# Patient Record
(demographics unavailable — no encounter records)

---

## 2024-12-30 NOTE — HISTORY OF PRESENT ILLNESS
[Patient reported PAP Smear was normal] : Patient reported PAP Smear was normal [FreeTextEntry1] : 23 year old female LMP 12/5/24 presents for annual GYN exam. Pt reports that she gets her period monthly and it last for 7 days. Reports that she get very heavy periods that she sometimes goes through 5 super tampons on her heaviest day.  States that she has painful cramps on the 2nd and 3rd day of her period, takes Midol which helps. Also reports worsening anxiety and mood changes the week prior to her menses. Works night shift in the NICU, feels that her sleep schedule is also contributing to her PMS.   PMHx: anxiety  SHx: Pt is not currently sexually active. Gender of partners: female.   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   [PapSmeardate] : 8/11/22 [Currently Active] : currently active [Women] : women [No] : No [Patient refuses STI testing] : Patient refuses STI testing

## 2024-12-30 NOTE — REVIEW OF SYSTEMS
[Patient Intake Form Reviewed] : Patient intake form was reviewed [Abn Vaginal bleeding] : abnormal vaginal bleeding [Pelvic pain] : pelvic pain [Anxiety] : anxiety [PMS/PMDD Symptoms] : PMS/PMDD symptoms [Negative] : Heme/Lymph

## 2024-12-30 NOTE — PLAN
[FreeTextEntry1] : Routine GYN Exam -Discussed and reviewed importance of monthly BSE -Declines STI testing, importance safe sexual practices discussed -Pap test collected and sent at todays visit -f/u PCP for recommended HCM, vaccinations and CA screening  PMS and Menorrhagia -discussed treatment options such as TATIANA ocp, Kyleena iud and SSRI. pt will consider-wants to talk with her parents and will call if she wants to further discuss treatment options.   rto 1 yr or sooner as needed. 
no

## 2025-05-20 NOTE — HISTORY OF PRESENT ILLNESS
[FreeTextEntry1] : Pt is a 23 yo G0 w/no significant PMH presenting for surgical consultation in the setting of a right ovarian mass.  Normally periods are regular, with heavy bleeding lasting 1 week and pain is managed with OTC pain medication. However, her last 2 periods were longer lasting 2 weeks with lighter spotting for the first week followed by a more normal period. She denies any associated symptoms or pain. She has no other concerns today. We discussed endometriosis and endometrioma pathogenesis. She inquired about egg freezing and we discussed the risks and benefits in the setting of possible endometriosis.  PMH: IBS, anxiety PSH: None OBHx: G0 GYN: see above, no h/o abnormal paps (last Dec 2024)  Meds: Allerga All: NKDA  FH: No GYN cancers SH: No A/T/D  Imaging:  US: FINDINGS: Uterus: 5.4 cm x 4.4 cm x 4.2 cm. Within normal limits. Endometrium: 3 mm. Within normal limits. Right ovary: 7.7 cm x 4.7 cm x 6.3 cm. Evidence of a 5.2 x 3.2 x 3.2 cm sized complex mass. Recommend further evaluation with contrast-enhanced MR imaging. Left ovary: 3.6 cm x 1.6 cm x 2.0 cm. Within normal limits Fluid: None. IMPRESSION: 5.2 cm sized complex right adnexal mass. MRI: IMPRESSION: 7.0 x 6.0 cm multi cystic lesion involving the right ovary. There is intrinsic T1 hyperintensity within a portion of the lesion, T2 shading, and no enhancement on subtraction images. Overall, I would favor this to represent an endometrioma, however, cystic ovarian malignancy is also on the differential. Recommend follow-up MRI in 3 months to reassess. Thickening of the junctional zone of the uterus, suggestive of adenomyosis.

## 2025-05-20 NOTE — HISTORY OF PRESENT ILLNESS
[FreeTextEntry1] : Pt is a 25 yo G0 w/no significant PMH presenting for surgical consultation in the setting of a right ovarian mass.  Normally periods are regular, with heavy bleeding lasting 1 week and pain is managed with OTC pain medication. However, her last 2 periods were longer lasting 2 weeks with lighter spotting for the first week followed by a more normal period. She denies any associated symptoms or pain. She has no other concerns today. We discussed endometriosis and endometrioma pathogenesis. She inquired about egg freezing and we discussed the risks and benefits in the setting of possible endometriosis.  PMH: IBS, anxiety PSH: None OBHx: G0 GYN: see above, no h/o abnormal paps (last Dec 2024)  Meds: Allerga All: NKDA  FH: No GYN cancers SH: No A/T/D  Imaging:  US: FINDINGS: Uterus: 5.4 cm x 4.4 cm x 4.2 cm. Within normal limits. Endometrium: 3 mm. Within normal limits. Right ovary: 7.7 cm x 4.7 cm x 6.3 cm. Evidence of a 5.2 x 3.2 x 3.2 cm sized complex mass. Recommend further evaluation with contrast-enhanced MR imaging. Left ovary: 3.6 cm x 1.6 cm x 2.0 cm. Within normal limits Fluid: None. IMPRESSION: 5.2 cm sized complex right adnexal mass. MRI: IMPRESSION: 7.0 x 6.0 cm multi cystic lesion involving the right ovary. There is intrinsic T1 hyperintensity within a portion of the lesion, T2 shading, and no enhancement on subtraction images. Overall, I would favor this to represent an endometrioma, however, cystic ovarian malignancy is also on the differential. Recommend follow-up MRI in 3 months to reassess. Thickening of the junctional zone of the uterus, suggestive of adenomyosis.

## 2025-05-20 NOTE — PLAN
[FreeTextEntry1] : Discussed the management of complex ovarian cysts at length.  Risks including but not limited to torsion, rupture and malignancy were discussed.  With findings consistent with probable pathologic ovarian cyst recommend surgical intervention.  Findings c/w probable endometrioma with adenomyosis in the uterus as well.  tumor markers sent pt to have fu pelvic sono and to schedule laparoscopic rt cystectomy During this visit 15 minutes were spent face-to-face with greater than 50% of the time dedicated to counseling. During this visit 40 minutes were spent face-to-face with greater than 50% of the time dedicated to counseling.

## 2025-06-11 NOTE — END OF VISIT
[FreeTextEntry3] : I, Pearl Watson, acted as a scribe on behalf of Dr. Sasha Wallace M.D. on 06/11/2025.   All medical entries made by the scribe were at my Dr. Sasha Wallace M.D. direction and personally dictated by me on 06/11/2025. I have reviewed the chart and agree that the record accurately reflects my personal performance of the history, physical exam, assessment and plan. I have also personally directed, reviewed, and agreed with the chart.

## 2025-06-11 NOTE — HISTORY OF PRESENT ILLNESS
[Pain is well-controlled] : pain is well-controlled [Clean/Dry/Intact] : clean, dry and intact [Erythema] : not erythematous [Swelling] : not swollen [Dehiscence] : not dehisced [Pathology reviewed] : pathology reviewed [de-identified] : 6/04/25 [de-identified] : R ovarian cystectomy, lysis of pelvic adhesions [de-identified] : 24 year old presents for post-op visit, s/p right ovarian cystectomy, lysis of pelvic adhesions(06/04/2025). She was discharged on POD 0. She reports intermittent vaginal itching and one day an incision had yellow discharge.  Pathology done on (6/04/2025) revealed: Final Diagnosis PELVIC WASH NEGATIVE FOR MALIGNANT CELLS.  Specimen consists of mesothelial cells with some reactive changes.   Operative findings done on (6/04/2025) revealed:    Exam under anesthesia revealed normal external female genitalia.  Normal vagina and cervix.  Normal mobile 8-week size uterus with posterior bulk and fullness of right adnexa and nonpalpable left adnexa.  Laparoscopy revealed an atraumatic entry site and normal upper abdominal survey, normal appendix. Completely obliterated posterior cul-de-sac with left and right ovaries adherent to posterior uterus.  2 cm corpus luteal cyst noted on the left ovary.  Right ovary enlarged with 4 cm ovarian cyst with chocolate brown fluid consistent with endometrioma upon rupture.  Left fallopian tube normal-appearing.  Right fallopian tube with distal end dilation and adhesions of fimbria to right ovary with possible endometriosis implant.  Multiple small endometriotic lesions in right adnexa, right pelvic sidewall, and posterior cul-de-sac.

## 2025-06-11 NOTE — HISTORY OF PRESENT ILLNESS
[Pain is well-controlled] : pain is well-controlled [Clean/Dry/Intact] : clean, dry and intact [Erythema] : not erythematous [Swelling] : not swollen [Dehiscence] : not dehisced [Pathology reviewed] : pathology reviewed [de-identified] : 6/04/25 [de-identified] : R ovarian cystectomy, lysis of pelvic adhesions [de-identified] : 24 year old presents for post-op visit, s/p right ovarian cystectomy, lysis of pelvic adhesions(06/04/2025). She was discharged on POD 0. She reports intermittent vaginal itching and one day an incision had yellow discharge.  Pathology done on (6/04/2025) revealed: Final Diagnosis PELVIC WASH NEGATIVE FOR MALIGNANT CELLS.  Specimen consists of mesothelial cells with some reactive changes.   Operative findings done on (6/04/2025) revealed:    Exam under anesthesia revealed normal external female genitalia.  Normal vagina and cervix.  Normal mobile 8-week size uterus with posterior bulk and fullness of right adnexa and nonpalpable left adnexa.  Laparoscopy revealed an atraumatic entry site and normal upper abdominal survey, normal appendix. Completely obliterated posterior cul-de-sac with left and right ovaries adherent to posterior uterus.  2 cm corpus luteal cyst noted on the left ovary.  Right ovary enlarged with 4 cm ovarian cyst with chocolate brown fluid consistent with endometrioma upon rupture.  Left fallopian tube normal-appearing.  Right fallopian tube with distal end dilation and adhesions of fimbria to right ovary with possible endometriosis implant.  Multiple small endometriotic lesions in right adnexa, right pelvic sidewall, and posterior cul-de-sac.

## 2025-06-11 NOTE — PLAN
[FreeTextEntry1] : 24 year old for post-op visit, s/p r ovarian cystectomy and lysis of pelvic adhesions 6/04/25 for endometriosis. Intraoperative findings c/w stage IV endo. Pt discharged POD 0. 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 Sanchez in a month or so and will discuss OCPs at that time

## 2025-07-10 NOTE — PLAN
[FreeTextEntry1] :  24 year old female to discuss OCP for endometriosis.   Plan: - Discussed OCP options  - Rx given for loloestrin (continuous dosing) - referred to [facility] for questions regarding egg freezing

## 2025-07-10 NOTE — END OF VISIT
[FreeTextEntry3] : I, Greer Urena acted as a scribe on behalf of Dr. Catherine Sanchez D.O. on 07/10/2025.   All medical entries made by the scribe were at my, Dr. Catherine Sanchez D.O., direction and personally dictated by me on 07/10/2025. I have reviewed the chart and agree that the record accurately reflects my personal performance of the history, physical exam, assessment and plan. I have also personally directed, reviewed, and agreed with the chart.

## 2025-07-10 NOTE — HISTORY OF PRESENT ILLNESS
[FreeTextEntry1] : 24 year old female presents for discussion of management of endometriosis with birth control. Pt was diagnosed with stage 4 endometriosis. Pt was hesitant to start birth control in the past.  PMH: IBS, anxiety PSH: None OBHx: G0 GYN: see above, no h/o abnormal paps (last Dec 2024) Meds: Allerga All: NKDA FH: No GYN cancers SH: No A/T/D